# Patient Record
(demographics unavailable — no encounter records)

---

## 2025-01-24 NOTE — PHYSICAL EXAM
[Appropriately responsive] : appropriately responsive [Alert] : alert [No Acute Distress] : no acute distress [No Lymphadenopathy] : no lymphadenopathy [Soft] : soft [Non-tender] : non-tender [Non-distended] : non-distended [No HSM] : No HSM [No Lesions] : no lesions [No Mass] : no mass [Oriented x3] : oriented x3 [Examination Of The Breasts] : a normal appearance [No Discharge] : no discharge [No Masses] : no breast masses were palpable [Labia Majora] : normal [Labia Minora] : normal [Normal] : normal [Uterine Adnexae] : normal [FreeTextEntry6] : RIGHT ADNEXAL MASS PALPATED NOT TENDER

## 2025-01-24 NOTE — HISTORY OF PRESENT ILLNESS
[FreeTextEntry1] : 36YO p0 LMP 12/26 HERE TODAY AN ANNUAL VISIT. h/o ABEL. She had recent unprotected intercourse with new partner, desires std testing  She works as an  in the [place] in the [employer]. [N] : Patient denies prior pregnancies [TextBox_4] : h/o hpv - h/o LEEP - around 207 - nl pap since 12/reg/5

## 2025-01-24 NOTE — PROCEDURE
[Cervical Pap Smear] : cervical Pap smear [Liquid Base] : liquid base [GC & Chlamydia via Pap] : GC & Chlamydia via Pap [Tolerated Well] : the patient tolerated the procedure well [No Complications] : there were no complications [Pelvic Mass] : pelvic mass [Transvaginal Ultrasound] : transvaginal ultrasound [de-identified] : ANTIONE PALMA [FreeTextEntry4] : TA SONO SHOWED NL UTERUS AND NL OVARIES AND  MYOMA POSTERIOR IN CULDESAC TV SONO SHOWED PEDUNCULATED MYOMA ON WIDE STALK [phone] CM  NL OVARIES B/L , NL ENDO TRILAMINAR W SMALL CYSTIC STRUCTRES

## 2025-01-24 NOTE — DISCUSSION/SUMMARY
[FreeTextEntry1] : 36 YO P0 ANNUAL GYN, STD TESTING,  PEDUNCULATED  FIRBOID, H/O LEEP PAP HPV STD SCREENING TA/TV SONO NL OVARIES, NL UTERUS, PEDUCNUCLATED [id number] MYOMA PELVIC MRI CBC, CMP, LIPID PANEL , HGBA1C, FSH LH, TSH T4